# Patient Record
Sex: FEMALE | Employment: PART TIME | URBAN - METROPOLITAN AREA
[De-identification: names, ages, dates, MRNs, and addresses within clinical notes are randomized per-mention and may not be internally consistent; named-entity substitution may affect disease eponyms.]

---

## 2024-01-13 ENCOUNTER — HOSPITAL ENCOUNTER (OUTPATIENT)
Age: 19
Discharge: HOME | End: 2024-01-13
Payer: MEDICAID

## 2024-01-13 LAB
AMOUNT GLUCOSE GIVEN: 50 G
COLLECT TIME, 1HR GLUCOSE: NORMAL
GLUCOSE 3H P 100 G GLC PO SERPL-MCNC: 138 MG/DL

## 2024-01-13 PROCEDURE — 36415 COLL VENOUS BLD VENIPUNCTURE: CPT

## 2024-01-13 PROCEDURE — 82947 ASSAY GLUCOSE BLOOD QUANT: CPT

## 2024-03-04 ENCOUNTER — ANESTHESIA (OUTPATIENT)
Dept: LABOR AND DELIVERY | Age: 19
DRG: 560 | End: 2024-03-04
Payer: MEDICAID

## 2024-03-04 ENCOUNTER — HOSPITAL ENCOUNTER (INPATIENT)
Age: 19
LOS: 2 days | Discharge: HOME | DRG: 560 | End: 2024-03-06
Attending: OBSTETRICS & GYNECOLOGY | Admitting: OBSTETRICS & GYNECOLOGY
Payer: MEDICAID

## 2024-03-04 ENCOUNTER — ANESTHESIA EVENT (OUTPATIENT)
Dept: LABOR AND DELIVERY | Age: 19
DRG: 560 | End: 2024-03-04
Payer: MEDICAID

## 2024-03-04 PROBLEM — Z3A.39 39 WEEKS GESTATION OF PREGNANCY: Status: ACTIVE | Noted: 2024-03-04

## 2024-03-04 PROBLEM — Z34.93 NORMAL PREGNANCY, THIRD TRIMESTER: Status: ACTIVE | Noted: 2024-03-04

## 2024-03-04 LAB
ABO + RH BLD: NORMAL
ALBUMIN SERPL-MCNC: 3.7 G/DL (ref 3.5–5.2)
ALP SERPL-CCNC: 207 U/L (ref 35–104)
ALT SERPL-CCNC: 11 U/L (ref 0–32)
AMPHET UR QL SCN: NEGATIVE
ANION GAP SERPL CALCULATED.3IONS-SCNC: 16 MMOL/L (ref 7–16)
ARM BAND NUMBER: NORMAL
AST SERPL-CCNC: 22 U/L (ref 0–31)
BACTERIA URNS QL MICRO: ABNORMAL
BARBITURATES UR QL SCN: NEGATIVE
BENZODIAZ UR QL: NEGATIVE
BILIRUB SERPL-MCNC: 0.2 MG/DL (ref 0–1.2)
BILIRUB UR QL STRIP: NEGATIVE
BLOOD BANK SAMPLE EXPIRATION: NORMAL
BLOOD GROUP ANTIBODIES SERPL: NEGATIVE
BUN SERPL-MCNC: 12 MG/DL (ref 6–20)
BUPRENORPHINE UR QL: NEGATIVE
CALCIUM SERPL-MCNC: 9 MG/DL (ref 8.6–10.2)
CANNABINOIDS UR QL SCN: NEGATIVE
CHLORIDE SERPL-SCNC: 100 MMOL/L (ref 98–107)
CLARITY UR: ABNORMAL
CO2 SERPL-SCNC: 19 MMOL/L (ref 22–29)
COCAINE UR QL SCN: NEGATIVE
COLOR UR: YELLOW
CREAT SERPL-MCNC: 0.6 MG/DL (ref 0.5–1)
ERYTHROCYTE [DISTWIDTH] IN BLOOD BY AUTOMATED COUNT: 15.1 % (ref 11.5–15)
FENTANYL UR QL: NEGATIVE
GFR SERPL CREATININE-BSD FRML MDRD: >60 ML/MIN/1.73M2
GLUCOSE SERPL-MCNC: 94 MG/DL (ref 74–99)
GLUCOSE UR STRIP-MCNC: NEGATIVE MG/DL
HCT VFR BLD AUTO: 36.6 % (ref 34–48)
HGB BLD-MCNC: 11.9 G/DL (ref 11.5–15.5)
HGB UR QL STRIP.AUTO: NEGATIVE
KETONES UR STRIP-MCNC: ABNORMAL MG/DL
LEUKOCYTE ESTERASE UR QL STRIP: ABNORMAL
MCH RBC QN AUTO: 28 PG (ref 26–35)
MCHC RBC AUTO-ENTMCNC: 32.5 G/DL (ref 32–34.5)
MCV RBC AUTO: 86.1 FL (ref 80–99.9)
METHADONE UR QL: NEGATIVE
NITRITE UR QL STRIP: NEGATIVE
OPIATES UR QL SCN: NEGATIVE
OXYCODONE UR QL SCN: NEGATIVE
PCP UR QL SCN: NEGATIVE
PH UR STRIP: 6.5 [PH] (ref 5–9)
PLATELET, FLUORESCENCE: 197 K/UL (ref 130–450)
PMV BLD AUTO: 12.7 FL (ref 7–12)
POTASSIUM SERPL-SCNC: 4.2 MMOL/L (ref 3.5–5)
PROT SERPL-MCNC: 7.1 G/DL (ref 6.4–8.3)
PROT UR STRIP-MCNC: NEGATIVE MG/DL
RBC # BLD AUTO: 4.25 M/UL (ref 3.5–5.5)
RBC #/AREA URNS HPF: ABNORMAL /HPF
SODIUM SERPL-SCNC: 135 MMOL/L (ref 132–146)
SP GR UR STRIP: 1.02 (ref 1–1.03)
TEST INFORMATION: NORMAL
UROBILINOGEN UR STRIP-ACNC: 1 EU/DL (ref 0–1)
WBC #/AREA URNS HPF: ABNORMAL /HPF
WBC OTHER # BLD: 10.9 K/UL (ref 4.5–11.5)

## 2024-03-04 PROCEDURE — 6370000000 HC RX 637 (ALT 250 FOR IP): Performed by: OBSTETRICS & GYNECOLOGY

## 2024-03-04 PROCEDURE — 85027 COMPLETE CBC AUTOMATED: CPT

## 2024-03-04 PROCEDURE — 2580000003 HC RX 258: Performed by: OBSTETRICS & GYNECOLOGY

## 2024-03-04 PROCEDURE — 1220000001 HC SEMI PRIVATE L&D R&B

## 2024-03-04 PROCEDURE — 2500000003 HC RX 250 WO HCPCS: Performed by: ANESTHESIOLOGY

## 2024-03-04 PROCEDURE — 80053 COMPREHEN METABOLIC PANEL: CPT

## 2024-03-04 PROCEDURE — 3700000025 EPIDURAL BLOCK: Performed by: ANESTHESIOLOGY

## 2024-03-04 PROCEDURE — 81001 URINALYSIS AUTO W/SCOPE: CPT

## 2024-03-04 PROCEDURE — 86900 BLOOD TYPING SEROLOGIC ABO: CPT

## 2024-03-04 PROCEDURE — 86592 SYPHILIS TEST NON-TREP QUAL: CPT

## 2024-03-04 PROCEDURE — 10907ZC DRAINAGE OF AMNIOTIC FLUID, THERAPEUTIC FROM PRODUCTS OF CONCEPTION, VIA NATURAL OR ARTIFICIAL OPENING: ICD-10-PCS | Performed by: OBSTETRICS & GYNECOLOGY

## 2024-03-04 PROCEDURE — 86901 BLOOD TYPING SEROLOGIC RH(D): CPT

## 2024-03-04 PROCEDURE — 86850 RBC ANTIBODY SCREEN: CPT

## 2024-03-04 PROCEDURE — 7200000001 HC VAGINAL DELIVERY

## 2024-03-04 PROCEDURE — 80307 DRUG TEST PRSMV CHEM ANLYZR: CPT

## 2024-03-04 PROCEDURE — 51702 INSERT TEMP BLADDER CATH: CPT

## 2024-03-04 PROCEDURE — 6360000002 HC RX W HCPCS: Performed by: OBSTETRICS & GYNECOLOGY

## 2024-03-04 RX ORDER — METHYLERGONOVINE MALEATE 0.2 MG/ML
200 INJECTION INTRAVENOUS PRN
Status: DISCONTINUED | OUTPATIENT
Start: 2024-03-04 | End: 2024-03-04

## 2024-03-04 RX ORDER — SODIUM CHLORIDE 9 MG/ML
INJECTION, SOLUTION INTRAVENOUS PRN
Status: DISCONTINUED | OUTPATIENT
Start: 2024-03-04 | End: 2024-03-06 | Stop reason: HOSPADM

## 2024-03-04 RX ORDER — DOCUSATE SODIUM 100 MG/1
100 CAPSULE, LIQUID FILLED ORAL 2 TIMES DAILY
Status: DISCONTINUED | OUTPATIENT
Start: 2024-03-04 | End: 2024-03-06 | Stop reason: HOSPADM

## 2024-03-04 RX ORDER — ONDANSETRON 4 MG/1
8 TABLET, ORALLY DISINTEGRATING ORAL EVERY 8 HOURS PRN
Status: DISCONTINUED | OUTPATIENT
Start: 2024-03-04 | End: 2024-03-06 | Stop reason: HOSPADM

## 2024-03-04 RX ORDER — SODIUM CHLORIDE 0.9 % (FLUSH) 0.9 %
5-40 SYRINGE (ML) INJECTION EVERY 12 HOURS SCHEDULED
Status: DISCONTINUED | OUTPATIENT
Start: 2024-03-04 | End: 2024-03-04

## 2024-03-04 RX ORDER — CARBOPROST TROMETHAMINE 250 UG/ML
250 INJECTION, SOLUTION INTRAMUSCULAR PRN
Status: DISCONTINUED | OUTPATIENT
Start: 2024-03-04 | End: 2024-03-04

## 2024-03-04 RX ORDER — EPHEDRINE SULFATE/0.9% NACL/PF 25 MG/5 ML
10 SYRINGE (ML) INTRAVENOUS PRN
Status: DISCONTINUED | OUTPATIENT
Start: 2024-03-04 | End: 2024-03-04

## 2024-03-04 RX ORDER — MODIFIED LANOLIN
OINTMENT (GRAM) TOPICAL PRN
Status: DISCONTINUED | OUTPATIENT
Start: 2024-03-04 | End: 2024-03-06 | Stop reason: HOSPADM

## 2024-03-04 RX ORDER — SODIUM CHLORIDE, SODIUM LACTATE, POTASSIUM CHLORIDE, CALCIUM CHLORIDE 600; 310; 30; 20 MG/100ML; MG/100ML; MG/100ML; MG/100ML
INJECTION, SOLUTION INTRAVENOUS CONTINUOUS
Status: DISCONTINUED | OUTPATIENT
Start: 2024-03-04 | End: 2024-03-04

## 2024-03-04 RX ORDER — MORPHINE SULFATE 2 MG/ML
1 INJECTION, SOLUTION INTRAMUSCULAR; INTRAVENOUS
Status: DISCONTINUED | OUTPATIENT
Start: 2024-03-04 | End: 2024-03-04

## 2024-03-04 RX ORDER — SODIUM CHLORIDE 9 MG/ML
25 INJECTION, SOLUTION INTRAVENOUS PRN
Status: DISCONTINUED | OUTPATIENT
Start: 2024-03-04 | End: 2024-03-04

## 2024-03-04 RX ORDER — SODIUM CHLORIDE 0.9 % (FLUSH) 0.9 %
5-40 SYRINGE (ML) INJECTION PRN
Status: DISCONTINUED | OUTPATIENT
Start: 2024-03-04 | End: 2024-03-04

## 2024-03-04 RX ORDER — SODIUM CHLORIDE 0.9 % (FLUSH) 0.9 %
5-40 SYRINGE (ML) INJECTION PRN
Status: DISCONTINUED | OUTPATIENT
Start: 2024-03-04 | End: 2024-03-06 | Stop reason: HOSPADM

## 2024-03-04 RX ORDER — ACETAMINOPHEN 500 MG
1000 TABLET ORAL EVERY 8 HOURS PRN
Status: DISCONTINUED | OUTPATIENT
Start: 2024-03-04 | End: 2024-03-06 | Stop reason: HOSPADM

## 2024-03-04 RX ORDER — SODIUM CHLORIDE, SODIUM LACTATE, POTASSIUM CHLORIDE, AND CALCIUM CHLORIDE .6; .31; .03; .02 G/100ML; G/100ML; G/100ML; G/100ML
1000 INJECTION, SOLUTION INTRAVENOUS PRN
Status: DISCONTINUED | OUTPATIENT
Start: 2024-03-04 | End: 2024-03-04

## 2024-03-04 RX ORDER — MISOPROSTOL 200 UG/1
800 TABLET ORAL PRN
Status: DISCONTINUED | OUTPATIENT
Start: 2024-03-04 | End: 2024-03-04

## 2024-03-04 RX ORDER — SODIUM CHLORIDE 0.9 % (FLUSH) 0.9 %
5-40 SYRINGE (ML) INJECTION EVERY 12 HOURS SCHEDULED
Status: DISCONTINUED | OUTPATIENT
Start: 2024-03-04 | End: 2024-03-06 | Stop reason: HOSPADM

## 2024-03-04 RX ORDER — ONDANSETRON 2 MG/ML
4 INJECTION INTRAMUSCULAR; INTRAVENOUS EVERY 6 HOURS PRN
Status: DISCONTINUED | OUTPATIENT
Start: 2024-03-04 | End: 2024-03-04

## 2024-03-04 RX ORDER — SODIUM CHLORIDE, SODIUM LACTATE, POTASSIUM CHLORIDE, AND CALCIUM CHLORIDE .6; .31; .03; .02 G/100ML; G/100ML; G/100ML; G/100ML
500 INJECTION, SOLUTION INTRAVENOUS PRN
Status: DISCONTINUED | OUTPATIENT
Start: 2024-03-04 | End: 2024-03-04

## 2024-03-04 RX ORDER — IBUPROFEN 800 MG/1
800 TABLET ORAL EVERY 8 HOURS PRN
Status: DISCONTINUED | OUTPATIENT
Start: 2024-03-04 | End: 2024-03-06 | Stop reason: HOSPADM

## 2024-03-04 RX ORDER — FERROUS SULFATE 325(65) MG
325 TABLET ORAL 2 TIMES DAILY WITH MEALS
Status: DISCONTINUED | OUTPATIENT
Start: 2024-03-05 | End: 2024-03-06 | Stop reason: HOSPADM

## 2024-03-04 RX ORDER — DOCUSATE SODIUM 100 MG/1
100 CAPSULE, LIQUID FILLED ORAL 2 TIMES DAILY
Status: DISCONTINUED | OUTPATIENT
Start: 2024-03-04 | End: 2024-03-04

## 2024-03-04 RX ADMIN — ONDANSETRON 4 MG: 2 INJECTION INTRAMUSCULAR; INTRAVENOUS at 16:18

## 2024-03-04 RX ADMIN — Medication 2.5 MILLION UNITS: at 17:00

## 2024-03-04 RX ADMIN — IBUPROFEN 800 MG: 800 TABLET, FILM COATED ORAL at 21:33

## 2024-03-04 RX ADMIN — Medication 2.5 MILLION UNITS: at 13:00

## 2024-03-04 RX ADMIN — Medication 10 UNITS: at 18:46

## 2024-03-04 RX ADMIN — MORPHINE SULFATE 1 MG: 2 INJECTION, SOLUTION INTRAMUSCULAR; INTRAVENOUS at 06:02

## 2024-03-04 RX ADMIN — Medication 5 ML: at 10:27

## 2024-03-04 RX ADMIN — Medication 15 ML/HR: at 10:28

## 2024-03-04 RX ADMIN — MORPHINE SULFATE 1 MG: 2 INJECTION, SOLUTION INTRAMUSCULAR; INTRAVENOUS at 09:04

## 2024-03-04 RX ADMIN — SODIUM CHLORIDE, POTASSIUM CHLORIDE, SODIUM LACTATE AND CALCIUM CHLORIDE: 600; 310; 30; 20 INJECTION, SOLUTION INTRAVENOUS at 15:34

## 2024-03-04 RX ADMIN — SODIUM CHLORIDE 5 MILLION UNITS: 900 INJECTION INTRAVENOUS at 08:25

## 2024-03-04 ASSESSMENT — PAIN SCALES - GENERAL
PAINLEVEL_OUTOF10: 8
PAINLEVEL_OUTOF10: 9
PAINLEVEL_OUTOF10: 7

## 2024-03-04 ASSESSMENT — PAIN - FUNCTIONAL ASSESSMENT: PAIN_FUNCTIONAL_ASSESSMENT: ACTIVITIES ARE NOT PREVENTED

## 2024-03-04 ASSESSMENT — PAIN DESCRIPTION - LOCATION
LOCATION: ABDOMEN
LOCATION: BACK

## 2024-03-04 ASSESSMENT — PAIN DESCRIPTION - DESCRIPTORS
DESCRIPTORS: CRAMPING
DESCRIPTORS: ACHING

## 2024-03-04 NOTE — PROGRESS NOTES
MAGALY Murillo CRNA at bedside for epidural, pt sat up 1015  Epidural in place 1024  Test dose 1025  Pt laid down  1028  Bolus dose given by CRNA

## 2024-03-04 NOTE — PROGRESS NOTES
Dr. Dinero at bedside performed AROM 1724 clear fluid. Vaginal exam 10cm/+1 station. Pt to let nurse know when pressure increases to start pushing.

## 2024-03-04 NOTE — PROGRESS NOTES
notified of patients arrival, c/o's, FHR, U/A, and SVE. New orders received to admit patient as a labor and for Morphine to be given.

## 2024-03-04 NOTE — H&P
03:50 AM    BILIRUBINUR NEGATIVE 03/04/2024 03:50 AM    GLUCOSEU NEGATIVE 03/04/2024 03:50 AM          Assessment:     39 and 4/7 weeks gestation   Active labor  Plan:     Expectant management, epidural if desired    Laura Armando MD,MD,3/4/2024 8:22 AM

## 2024-03-04 NOTE — ANESTHESIA PRE PROCEDURE
Department of Anesthesiology  Preprocedure Note       Name:  Adrienne Mike   Age:  18 y.o.  :  2005                                          MRN:  [id number]         Date:  3/4/2024      Surgeon: * No surgeons listed *    Procedure: * No procedures listed *    Medications prior to admission:   Prior to Admission medications    Medication Sig Start Date End Date Taking? Authorizing Provider   Prenatal MV-Min-Fe Fum-FA-DHA (PRENATAL 1 PO) Take by mouth   Yes Provider, MD Lisa       Current medications:    Current Facility-Administered Medications   Medication Dose Route Frequency Provider Last Rate Last Admin    morphine (PF) injection 1 mg  1 mg IntraVENous Q3H PRN Laura Armando MD   1 mg at 24 0904    lactated ringers IV soln infusion   IntraVENous Continuous Laura Armando  mL/hr at 24 0730 Rate Verify at 24 0730    lactated ringers bolus 500 mL  500 mL IntraVENous PRN Laura Armando MD        Or    lactated ringers bolus 1,000 mL  1,000 mL IntraVENous PRN Laura Armando MD        sodium chloride flush 0.9 % injection 5-40 mL  5-40 mL IntraVENous 2 times per day Laura Armando MD        sodium chloride flush 0.9 % injection 5-40 mL  5-40 mL IntraVENous PRN Laura Armando MD        0.9 % sodium chloride infusion  25 mL IntraVENous PRN Laura Armando MD        methylergonovine (METHERGINE) injection 200 mcg  200 mcg IntraMUSCular PRN Laura Armando MD        carboprost (HEMABATE) injection 250 mcg  250 mcg IntraMUSCular PRN Laura Armando MD        miSOPROStol (CYTOTEC) tablet 800 mcg  800 mcg Rectal PRN Laura Armando MD        tranexamic acid (CYKLOKAPRON) 1,000 mg in sodium chloride 0.9 % 100 mL IVPB  1,000 mg IntraVENous Once PRN Laura Armando MD        oxytocin (PITOCIN) 30 units in 500 mL infusion  87.3 vel-units/min IntraVENous Continuous PRN Laura Armando MD        And    oxytocin (PITOCIN) 30 units in 500 mL

## 2024-03-04 NOTE — PROGRESS NOTES
Assumed care of patient. Pt breathing well through contractions. Maternal perception of fetal movement present. Fetal heart rate and uterine activity being monitored every 15 minutes by RN while pt in labor.

## 2024-03-04 NOTE — PROGRESS NOTES
Pt arrived via wheelchair to unit at this time with complaints of contractions. , 39.4 weeks gestation. Pt oriented to TR2, urine sample collected and EFM applied. Audible heart tones heard via US by RN. Maternal perception of fetal movement verified. Pt denies any bleeding or leaking of fluid. Ice water and ice chips provided. Call light in reach.

## 2024-03-04 NOTE — ANESTHESIA PROCEDURE NOTES
Epidural Block    Patient location during procedure: OB  Start time: 3/4/2024 10:15 AM  End time: 3/4/2024 10:35 AM  Reason for block: labor epidural  Staffing  Performed: resident/CRNA   Anesthesiologist: Levi Pavon MD  Resident/CRNA: Gloria Murillo APRN - CRNA  Performed by: Gloria Murillo APRN - CRNA  Authorized by: Levi Pavon MD    Epidural  Patient position: sitting  Prep: Betadine  Patient monitoring: cardiac monitor, continuous pulse ox and frequent blood pressure checks  Approach: midline  Location: L3-4  Injection technique: LARRY saline  Provider prep: mask and sterile gloves  Needle  Needle type: Tuohy   Needle gauge: 18 G  Needle length: 3.5 in  Needle insertion depth: 6 cm  Catheter type: end hole  Catheter size: 20 G.  Catheter at skin depth: 9 cm  Test dose: negative  Kit: [id number]  Lot number: [id number]  Expiration date: 3/31/2025Catheter Secured: tegaderm and tape  Assessment  Hemodynamics: stable  Attempts: 1  Outcomes: uncomplicated and patient tolerated procedure well  Preanesthetic Checklist  Completed: patient identified, IV checked, site marked, risks and benefits discussed, surgical/procedural consents, equipment checked, pre-op evaluation, timeout performed, anesthesia consent given, oxygen available and monitors applied/VS acknowledged

## 2024-03-04 NOTE — L&D DELIVERY NOTE
Department of Obstetrics and Gynecology  Spontaneous Vaginal Delivery Note         Pre-operative Diagnosis:  Term pregnancy, Spontaneous labor, Single fetus, and Uncomplicated pregnancy    Post-operative Diagnosis:  Same + live female wt 6 #,4 oz  Procedure:  Spontaneous vaginal delivery    Surgeon:  Laura Armando MD    Anesthesia:  epidural anesthesia    Estimated blood loss:  300ml    Specimen:  Placenta not sent to pathology     Cord blood sent Yes    Complications: Nuchal cord x 1 loose    Condition:  infant stable to general nursery and mother stable    Details of Procedure:   The patient is a 18 y.o. female at 39w4d   OB History          1    Para        Term                AB        Living             SAB        IAB        Ectopic        Molar        Multiple        Live Births                 who was admitted for active phase labor. She received the following interventions: ARBOW She was known to be GBS positive and did receive antibiotic prophylaxis. The patient progressed well,did receive an epidural, became complete and started to push. After pushing for 20 minutes the fetal head was at the perineum, nose and mouth suctioned with bulb suction and the rest of the infant delivered atraumatically, placed on mother abdomen.Cord was clamped and cut and infant handed off to the waiting nurse for evaluation. The delivery of the placenta was spontaneous. The perineum and vagina were explored and no tears or lacerations were noted.

## 2024-03-05 LAB
HCT VFR BLD AUTO: 35.4 % (ref 34–48)
HGB BLD-MCNC: 11.5 G/DL (ref 11.5–15.5)
RPR SER QL: NONREACTIVE

## 2024-03-05 PROCEDURE — 1220000001 HC SEMI PRIVATE L&D R&B

## 2024-03-05 PROCEDURE — 85018 HEMOGLOBIN: CPT

## 2024-03-05 PROCEDURE — 85014 HEMATOCRIT: CPT

## 2024-03-05 PROCEDURE — 6370000000 HC RX 637 (ALT 250 FOR IP): Performed by: OBSTETRICS & GYNECOLOGY

## 2024-03-05 RX ADMIN — DOCUSATE SODIUM 100 MG: 100 CAPSULE, LIQUID FILLED ORAL at 07:52

## 2024-03-05 RX ADMIN — ACETAMINOPHEN 1000 MG: 500 TABLET ORAL at 23:59

## 2024-03-05 RX ADMIN — IBUPROFEN 800 MG: 800 TABLET, FILM COATED ORAL at 15:48

## 2024-03-05 RX ADMIN — Medication: at 21:23

## 2024-03-05 RX ADMIN — Medication: at 08:26

## 2024-03-05 RX ADMIN — ACETAMINOPHEN 1000 MG: 500 TABLET ORAL at 07:52

## 2024-03-05 RX ADMIN — DOCUSATE SODIUM 100 MG: 100 CAPSULE, LIQUID FILLED ORAL at 21:18

## 2024-03-05 ASSESSMENT — PAIN DESCRIPTION - DESCRIPTORS: DESCRIPTORS: DISCOMFORT;SORE;TENDER;ACHING

## 2024-03-05 ASSESSMENT — PAIN SCALES - GENERAL
PAINLEVEL_OUTOF10: 4
PAINLEVEL_OUTOF10: 4
PAINLEVEL_OUTOF10: 5

## 2024-03-05 ASSESSMENT — PAIN - FUNCTIONAL ASSESSMENT: PAIN_FUNCTIONAL_ASSESSMENT: ACTIVITIES ARE NOT PREVENTED

## 2024-03-05 ASSESSMENT — PAIN DESCRIPTION - ORIENTATION: ORIENTATION: LOWER;MID

## 2024-03-05 ASSESSMENT — PAIN DESCRIPTION - LOCATION: LOCATION: BACK

## 2024-03-05 NOTE — PROGRESS NOTES
Dr. Dinero in room for delivery at 1843. Live female born via vaginal delivery at 1846. Weight 6lb 4oz, apgars 9/9

## 2024-03-05 NOTE — PROGRESS NOTES
Department of Obstetrics and Gynecology  Labor and Delivery  Attending Post Partum Progress Note      SUBJECTIVE:  Doing well with no complaints  OBJECTIVE:      Vitals:  BP (!) 114/56   Pulse 65   Temp 98.3 °F (36.8 °C) (Oral)   Resp 18   Ht 1.499 m (4' 11\")   Wt 69.9 kg (154 lb)   LMP 06/01/2023 (Exact Date)   SpO2 100%   Breastfeeding Unknown   BMI 31.10 kg/m²     ABDOMEN:  Soft, well contracted uterus   Lochia: Normal  No calf tenderness    DATA:    CBC:    Lab Results   Component Value Date/Time    WBC 10.9 03/04/2024 05:15 AM    RBC 4.25 03/04/2024 05:15 AM    HGB 11.5 03/05/2024 04:30 AM    HCT 35.4 03/05/2024 04:30 AM    MCV 86.1 03/04/2024 05:15 AM    RDW 15.1 03/04/2024 05:15 AM       ASSESSMENT & PLAN:      PPD # 1    Continue current care.  Discharge home tomorrow.

## 2024-03-05 NOTE — PROGRESS NOTES
Dr. Dinero called in gave order to start pushing. Began pushing at 1805. Fetal heart rate and uterine activity being monitored every 5 minutes while pushing. RN continuously remains at bedside.

## 2024-03-05 NOTE — PROGRESS NOTES
Patient left leg still numb and heavy, patient unable to get up at this time. Patient will call out when she feels the need to void. Patient instructed not to get out of bed.

## 2024-03-05 NOTE — ANESTHESIA POSTPROCEDURE EVALUATION
Department of Anesthesiology  Postprocedure Note    Patient: Adrienne Mike  MRN: [id number]  YOB: 2005  Date of evaluation: 3/5/2024    Procedure Summary     Date: 03/04/24 Room / Location:     Anesthesia Start: 1015 Anesthesia Stop: 1846    Procedure: Labor Analgesia Diagnosis:     Scheduled Providers:  Responsible Provider: Levi Pavon MD    Anesthesia Type: epidural ASA Status: 1          Anesthesia Type: No value filed.    Maximus Phase I:      Maximus Phase II:      Anesthesia Post Evaluation    Patient location during evaluation: bedside  Patient participation: complete - patient participated  Level of consciousness: awake  Pain score: 0  Airway patency: patent  Nausea & Vomiting: no nausea and no vomiting  Cardiovascular status: hemodynamically stable  Respiratory status: acceptable  Hydration status: euvolemic  Pain management: adequate        No notable events documented.

## 2024-03-06 VITALS
WEIGHT: 154 LBS | DIASTOLIC BLOOD PRESSURE: 70 MMHG | SYSTOLIC BLOOD PRESSURE: 114 MMHG | HEIGHT: 59 IN | HEART RATE: 66 BPM | BODY MASS INDEX: 31.04 KG/M2 | TEMPERATURE: 98.1 F | OXYGEN SATURATION: 100 % | RESPIRATION RATE: 16 BRPM

## 2024-03-06 PROCEDURE — 6370000000 HC RX 637 (ALT 250 FOR IP): Performed by: OBSTETRICS & GYNECOLOGY

## 2024-03-06 RX ORDER — IBUPROFEN 800 MG/1
800 TABLET ORAL EVERY 8 HOURS PRN
Qty: 120 TABLET | Refills: 0 | Status: SHIPPED | OUTPATIENT
Start: 2024-03-06

## 2024-03-06 RX ADMIN — ACETAMINOPHEN 1000 MG: 500 TABLET ORAL at 08:38

## 2024-03-06 RX ADMIN — DOCUSATE SODIUM 100 MG: 100 CAPSULE, LIQUID FILLED ORAL at 08:37

## 2024-03-06 ASSESSMENT — PAIN SCALES - GENERAL: PAINLEVEL_OUTOF10: 4

## 2024-03-06 ASSESSMENT — PAIN DESCRIPTION - LOCATION: LOCATION: GROIN

## 2024-03-06 ASSESSMENT — PAIN DESCRIPTION - DESCRIPTORS: DESCRIPTORS: ACHING

## 2024-03-06 NOTE — DISCHARGE SUMMARY
Obstetrical Discharge Form         Patients Name  Adrienne Mike    Gestational Age:  39w4d    Antepartum complications: none    Date of Delivery:   3/4/2024       6:46 PM      Type of Delivery:   Vaginal, Spontaneous [250]   Rupture Date/time:    No data found      No data found     Presentation:    Vertex [1]   Position:                      Anesthesia:         Feeding method:         Delivered By:   TERRIE-AAOWEN MALDONADO R     Baby:       Information for the patient's :  Светлана Mike [[id number]]        Intrapartum complications: None  Postpartum complications: none  Discharge Date:   3/6/2024  Discharge Condition: Stable  Disposition:   Home    Plan:   Follow up    in 6 weeks

## 2024-03-06 NOTE — PLAN OF CARE
Problem: Pain  Goal: Verbalizes/displays adequate comfort level or baseline comfort level  3/6/2024 0846 by Flower Aj, RN  Outcome: Completed  3/6/2024 0551 by Leticia Bay, RN  Outcome: Progressing

## 2024-03-06 NOTE — DISCHARGE INSTRUCTIONS
sleeplessness, and mood swings after you go home. If you feel upset or hopeless for more than a few days or are having trouble doing the things you need to do, talk to your doctor.  Constipation and hemorrhoids  Drink plenty of fluids. If you have kidney, heart, or liver disease and have to limit fluids, talk with your doctor before you increase the amount of fluids you drink.  Eat plenty of fiber each day. Have a bran muffin or bran cereal for breakfast. Try eating a piece of fruit for a mid-afternoon snack.  For painful, itchy hemorrhoids, put ice or a cold pack on the area several times a day for 10 minutes at a time. Follow this by putting a warm compress on the area for another 10 to 20 minutes or by sitting in a shallow, warm bath.  When should you call for help?  Share this information with your partner, family, or a friend. They can help you watch for warning signs.  Call 911  anytime you think you may need emergency care. For example, call if:    You have thoughts of harming yourself, your baby, or another person.     You passed out (lost consciousness).     You have chest pain, are short of breath, or cough up blood.     You have a seizure.   Where to get help 24 hours a day, 7 days a week   If you or someone you know talks about suicide, self-harm, a mental health crisis, a substance use crisis, or any other kind of emotional distress, get help right away. You can:    Call the Suicide and Crisis Lifeline at 988.     Call 9-948-986-TALK (1-633.890.7258).     Text HOME to 360097 to access the Crisis Text Line.   Consider saving these numbers in your phone.  Go to [employer] for more information or to chat online.  Call your doctor now or seek immediate medical care if:    You have signs of hemorrhage (too much bleeding), such as:  Heavy vaginal bleeding. This means that you are soaking through one or more pads in an hour. Or you pass blood clots bigger than an egg.  Feeling dizzy or lightheaded, or you

## 2025-03-05 ENCOUNTER — HOSPITAL ENCOUNTER (EMERGENCY)
Age: 20
Discharge: HOME | End: 2025-03-06

## 2025-03-05 ENCOUNTER — APPOINTMENT (OUTPATIENT)
Dept: GENERAL RADIOLOGY | Age: 20
End: 2025-03-05

## 2025-03-05 DIAGNOSIS — I49.1 PREMATURE ATRIAL CONTRACTIONS: Primary | ICD-10-CM

## 2025-03-05 DIAGNOSIS — R00.2 PALPITATIONS: ICD-10-CM

## 2025-03-05 DIAGNOSIS — Z72.89 ENGAGES IN VAPING: ICD-10-CM

## 2025-03-05 LAB
ANION GAP SERPL CALCULATED.3IONS-SCNC: 14 MMOL/L (ref 7–16)
BASOPHILS # BLD: 0.04 K/UL (ref 0–0.2)
BASOPHILS NFR BLD: 0 % (ref 0–2)
BUN SERPL-MCNC: 10 MG/DL (ref 6–20)
CALCIUM SERPL-MCNC: 9.2 MG/DL (ref 8.6–10.2)
CHLORIDE SERPL-SCNC: 101 MMOL/L (ref 98–107)
CO2 SERPL-SCNC: 23 MMOL/L (ref 22–29)
CREAT SERPL-MCNC: 0.7 MG/DL (ref 0.5–1)
D-DIMER QUANTITATIVE: <200 NG/ML DDU (ref 0–230)
EOSINOPHIL # BLD: 0.13 K/UL (ref 0.05–0.5)
EOSINOPHILS RELATIVE PERCENT: 1 % (ref 0–6)
ERYTHROCYTE [DISTWIDTH] IN BLOOD BY AUTOMATED COUNT: 13.1 % (ref 11.5–15)
GFR, ESTIMATED: >90 ML/MIN/1.73M2
GLUCOSE SERPL-MCNC: 102 MG/DL (ref 74–99)
HCT VFR BLD AUTO: 38.2 % (ref 34–48)
HGB BLD-MCNC: 12.8 G/DL (ref 11.5–15.5)
IMM GRANULOCYTES # BLD AUTO: <0.03 K/UL (ref 0–0.58)
IMM GRANULOCYTES NFR BLD: 0 % (ref 0–5)
LYMPHOCYTES NFR BLD: 3.71 K/UL (ref 1.5–4)
LYMPHOCYTES RELATIVE PERCENT: 36 % (ref 20–42)
MAGNESIUM SERPL-MCNC: 1.9 MG/DL (ref 1.6–2.6)
MCH RBC QN AUTO: 29.3 PG (ref 26–35)
MCHC RBC AUTO-ENTMCNC: 33.5 G/DL (ref 32–34.5)
MCV RBC AUTO: 87.4 FL (ref 80–99.9)
MONOCYTES NFR BLD: 0.6 K/UL (ref 0.1–0.95)
MONOCYTES NFR BLD: 6 % (ref 2–12)
NEUTROPHILS NFR BLD: 57 % (ref 43–80)
NEUTS SEG NFR BLD: 5.85 K/UL (ref 1.8–7.3)
PLATELET # BLD AUTO: 258 K/UL (ref 130–450)
PMV BLD AUTO: 12.1 FL (ref 7–12)
POTASSIUM SERPL-SCNC: 3.8 MMOL/L (ref 3.5–5)
RBC # BLD AUTO: 4.37 M/UL (ref 3.5–5.5)
SODIUM SERPL-SCNC: 138 MMOL/L (ref 132–146)
T4 FREE SERPL-MCNC: 1.1 NG/DL (ref 0.9–1.7)
TROPONIN I SERPL HS-MCNC: <6 NG/L (ref 0–9)
TSH SERPL DL<=0.05 MIU/L-ACNC: 1.48 UIU/ML (ref 0.27–4.2)
WBC OTHER # BLD: 10.4 K/UL (ref 4.5–11.5)

## 2025-03-05 PROCEDURE — 81001 URINALYSIS AUTO W/SCOPE: CPT

## 2025-03-05 PROCEDURE — 93005 ELECTROCARDIOGRAM TRACING: CPT | Performed by: EMERGENCY MEDICINE

## 2025-03-05 PROCEDURE — 71046 X-RAY EXAM CHEST 2 VIEWS: CPT

## 2025-03-05 PROCEDURE — 80048 BASIC METABOLIC PNL TOTAL CA: CPT

## 2025-03-05 PROCEDURE — 80143 DRUG ASSAY ACETAMINOPHEN: CPT

## 2025-03-05 PROCEDURE — 84443 ASSAY THYROID STIM HORMONE: CPT

## 2025-03-05 PROCEDURE — 84439 ASSAY OF FREE THYROXINE: CPT

## 2025-03-05 PROCEDURE — 80307 DRUG TEST PRSMV CHEM ANLYZR: CPT

## 2025-03-05 PROCEDURE — 83735 ASSAY OF MAGNESIUM: CPT

## 2025-03-05 PROCEDURE — 99285 EMERGENCY DEPT VISIT HI MDM: CPT

## 2025-03-05 PROCEDURE — 85025 COMPLETE CBC W/AUTO DIFF WBC: CPT

## 2025-03-05 PROCEDURE — 84484 ASSAY OF TROPONIN QUANT: CPT

## 2025-03-05 PROCEDURE — G0480 DRUG TEST DEF 1-7 CLASSES: HCPCS

## 2025-03-05 PROCEDURE — 80179 DRUG ASSAY SALICYLATE: CPT

## 2025-03-05 PROCEDURE — 2580000003 HC RX 258

## 2025-03-05 PROCEDURE — 96360 HYDRATION IV INFUSION INIT: CPT

## 2025-03-05 PROCEDURE — 85379 FIBRIN DEGRADATION QUANT: CPT

## 2025-03-05 RX ORDER — 0.9 % SODIUM CHLORIDE 0.9 %
1000 INTRAVENOUS SOLUTION INTRAVENOUS ONCE
Status: COMPLETED | OUTPATIENT
Start: 2025-03-05 | End: 2025-03-06

## 2025-03-05 RX ADMIN — SODIUM CHLORIDE 1000 ML: 0.9 INJECTION, SOLUTION INTRAVENOUS at 22:44

## 2025-03-05 ASSESSMENT — PAIN DESCRIPTION - LOCATION: LOCATION: CHEST

## 2025-03-05 ASSESSMENT — LIFESTYLE VARIABLES
HOW OFTEN DO YOU HAVE A DRINK CONTAINING ALCOHOL: NEVER
HOW MANY STANDARD DRINKS CONTAINING ALCOHOL DO YOU HAVE ON A TYPICAL DAY: PATIENT DOES NOT DRINK

## 2025-03-05 ASSESSMENT — PAIN - FUNCTIONAL ASSESSMENT: PAIN_FUNCTIONAL_ASSESSMENT: 0-10

## 2025-03-05 ASSESSMENT — PAIN SCALES - GENERAL: PAINLEVEL_OUTOF10: 4

## 2025-03-06 VITALS
BODY MASS INDEX: 35.14 KG/M2 | SYSTOLIC BLOOD PRESSURE: 125 MMHG | RESPIRATION RATE: 16 BRPM | OXYGEN SATURATION: 100 % | DIASTOLIC BLOOD PRESSURE: 79 MMHG | HEART RATE: 74 BPM | WEIGHT: 174 LBS | TEMPERATURE: 97.3 F

## 2025-03-06 LAB
AMPHET UR QL SCN: NEGATIVE
APAP SERPL-MCNC: <5 UG/ML (ref 10–30)
BARBITURATES UR QL SCN: NEGATIVE
BENZODIAZ UR QL: NEGATIVE
BILIRUB UR QL STRIP: ABNORMAL
BUPRENORPHINE UR QL: NEGATIVE
CANNABINOIDS UR QL SCN: NEGATIVE
CLARITY UR: ABNORMAL
COCAINE UR QL SCN: NEGATIVE
COLOR UR: YELLOW
EKG ATRIAL RATE: 99 BPM
EKG P AXIS: 48 DEGREES
EKG P-R INTERVAL: 160 MS
EKG Q-T INTERVAL: 336 MS
EKG QRS DURATION: 74 MS
EKG QTC CALCULATION (BAZETT): 431 MS
EKG R AXIS: 44 DEGREES
EKG T AXIS: 43 DEGREES
EKG VENTRICULAR RATE: 99 BPM
EPI CELLS #/AREA URNS HPF: ABNORMAL /HPF
ETHANOLAMINE SERPL-MCNC: <10 MG/DL (ref 0–0.08)
FENTANYL UR QL: NEGATIVE
GLUCOSE UR STRIP-MCNC: NEGATIVE MG/DL
HCG, URINE, POC: NEGATIVE
HGB UR QL STRIP.AUTO: ABNORMAL
KETONES UR STRIP-MCNC: ABNORMAL MG/DL
LEUKOCYTE ESTERASE UR QL STRIP: NEGATIVE
Lab: NORMAL
METHADONE UR QL: NEGATIVE
NEGATIVE QC PASS/FAIL: NORMAL
NITRITE UR QL STRIP: POSITIVE
OPIATES UR QL SCN: NEGATIVE
OXYCODONE UR QL SCN: NEGATIVE
PCP UR QL SCN: NEGATIVE
PH UR STRIP: 5.5 [PH] (ref 5–8)
POSITIVE QC PASS/FAIL: NORMAL
PROT UR STRIP-MCNC: 30 MG/DL
RBC #/AREA URNS HPF: ABNORMAL /HPF
SALICYLATES SERPL-MCNC: <0.3 MG/DL (ref 0–30)
SP GR UR STRIP: >1.03 (ref 1–1.03)
TEST INFORMATION: NORMAL
TOXIC TRICYCLIC SC,BLOOD: NEGATIVE
UROBILINOGEN UR STRIP-ACNC: 1 EU/DL (ref 0–1)
WBC #/AREA URNS HPF: ABNORMAL /HPF

## 2025-03-06 PROCEDURE — 93010 ELECTROCARDIOGRAM REPORT: CPT | Performed by: INTERNAL MEDICINE

## 2025-03-06 NOTE — ED PROVIDER NOTES
discharge instructions. The patient expressed understanding. Vitals were stable and they were in no distress at discharge.     FINAL IMPRESSION      1. Premature atrial contractions    2. Palpitations    3. Engages in vaping          DISPOSITION/PLAN     DISPOSITION Decision To Discharge 03/06/2025 01:32:01 AM  Discharge to home.  Patient condition is good.    PATIENT REFERRED TO:  [facility]  [phone]  Schedule an appointment as soon as possible for a visit in 2 days        DISCHARGE MEDICATIONS:  New Prescriptions    No medications on file       DISCONTINUED MEDICATIONS:  Discontinued Medications    No medications on file            END OF PROVIDER NOTE    I am the primary clinician of record.     Electronically signed by ALEJANDRO Yeboah CNP   DD: 3/5/25    (Please note that portions of this note were completed with a voice recognition program.  Efforts were made to edit the dictations but occasionally words are mis-transcribed.)          Aby Banegas APRN - CNP  03/06/25 0139

## 2025-05-24 ENCOUNTER — HOSPITAL ENCOUNTER (EMERGENCY)
Age: 20
Discharge: HOME | End: 2025-05-24
Attending: EMERGENCY MEDICINE
Payer: MEDICAID

## 2025-05-24 VITALS
RESPIRATION RATE: 16 BRPM | WEIGHT: 174 LBS | OXYGEN SATURATION: 99 % | SYSTOLIC BLOOD PRESSURE: 126 MMHG | HEART RATE: 82 BPM | TEMPERATURE: 98.2 F | HEIGHT: 59 IN | DIASTOLIC BLOOD PRESSURE: 74 MMHG | BODY MASS INDEX: 35.08 KG/M2

## 2025-05-24 DIAGNOSIS — N93.9 VAGINA BLEEDING: ICD-10-CM

## 2025-05-24 DIAGNOSIS — Z32.02 NEGATIVE PREGNANCY TEST: Primary | ICD-10-CM

## 2025-05-24 LAB
BACTERIA URNS QL MICRO: ABNORMAL
BILIRUB UR QL STRIP: NEGATIVE
CLARITY UR: ABNORMAL
COLOR UR: ABNORMAL
EPI CELLS #/AREA URNS HPF: ABNORMAL /HPF
GLUCOSE UR STRIP-MCNC: NEGATIVE MG/DL
HCG UR QL: NEGATIVE
HGB UR QL STRIP.AUTO: ABNORMAL
KETONES UR STRIP-MCNC: NEGATIVE MG/DL
LEUKOCYTE ESTERASE UR QL STRIP: NEGATIVE
MUCOUS THREADS URNS QL MICRO: PRESENT
NITRITE UR QL STRIP: NEGATIVE
PH UR STRIP: 7.5 [PH] (ref 5–8)
PROT UR STRIP-MCNC: 30 MG/DL
RBC #/AREA URNS HPF: ABNORMAL /HPF
SP GR UR STRIP: 1.02 (ref 1–1.03)
UROBILINOGEN UR STRIP-ACNC: >8 EU/DL (ref 0–1)
WBC #/AREA URNS HPF: ABNORMAL /HPF

## 2025-05-24 PROCEDURE — 81001 URINALYSIS AUTO W/SCOPE: CPT

## 2025-05-24 PROCEDURE — 99283 EMERGENCY DEPT VISIT LOW MDM: CPT

## 2025-05-24 PROCEDURE — 84703 CHORIONIC GONADOTROPIN ASSAY: CPT

## 2025-05-24 ASSESSMENT — PAIN DESCRIPTION - DESCRIPTORS: DESCRIPTORS: CRAMPING

## 2025-05-24 ASSESSMENT — PAIN SCALES - GENERAL: PAINLEVEL_OUTOF10: 2

## 2025-05-24 ASSESSMENT — PAIN DESCRIPTION - FREQUENCY: FREQUENCY: CONTINUOUS

## 2025-05-24 ASSESSMENT — PAIN DESCRIPTION - PAIN TYPE: TYPE: ACUTE PAIN

## 2025-05-24 ASSESSMENT — PAIN DESCRIPTION - LOCATION: LOCATION: ABDOMEN

## 2025-05-24 ASSESSMENT — PAIN - FUNCTIONAL ASSESSMENT: PAIN_FUNCTIONAL_ASSESSMENT: 0-10

## 2025-05-24 NOTE — ED PROVIDER NOTES
HPI:  5/24/25,   Time: 6:25 PM EDT         Adrienne Mike is a 19 y.o. female presenting to the ED for chief complaint: Patient states her LMP was on 5/4/2025 after which a week ago she tested positive with a home pregnancy test (patient did 6 tests), and today started having vaginal bleeding.  Patient already scheduled see OB/GYN [facility].    ROS:   Pertinent positives and negatives are stated within HPI, all other systems reviewed and are negative.  --------------------------------------------- PAST HISTORY ---------------------------------------------  Past Medical History:  has no past medical history on file.    Past Surgical History:  has no past surgical history on file.    Social History:  reports that she has never smoked. She has never used smokeless tobacco. She reports that she does not drink alcohol and does not use drugs.    Family History: family history is not on file.     The patient’s home medications have been reviewed.    Allergies: Patient has no known allergies.    -------------------------------------------------- RESULTS -------------------------------------------------  All laboratory and radiology results have been personally reviewed by myself   LABS:  Results for orders placed or performed during the hospital encounter of 05/24/25   Pregnancy, Urine   Result Value Ref Range    Pregnancy, Urine NEGATIVE NEGATIVE   Urinalysis with Microscopic   Result Value Ref Range    Color, UA Dark Yellow (A) Yellow    Turbidity UA SLIGHTLY CLOUDY (A) Clear    Glucose, Ur NEGATIVE NEGATIVE mg/dL    Bilirubin, Urine NEGATIVE NEGATIVE    Ketones, Urine NEGATIVE NEGATIVE mg/dL    Specific Gravity, UA 1.025 1.005 - 1.030    Urine Hgb LARGE (A) NEGATIVE    pH, Urine 7.5 5.0 - 8.0    Protein, UA 30 (A) NEGATIVE mg/dL    Urobilinogen, Urine >8.0 (H) 0.0 - 1.0 EU/dL    Nitrite, Urine NEGATIVE NEGATIVE    Leukocyte Esterase, Urine NEGATIVE NEGATIVE    WBC, UA 0 TO 5 0 TO 5 /HPF    RBC, UA TOO NUMEROUS TO COUNT (A) 0